# Patient Record
Sex: FEMALE | Race: WHITE
[De-identification: names, ages, dates, MRNs, and addresses within clinical notes are randomized per-mention and may not be internally consistent; named-entity substitution may affect disease eponyms.]

---

## 2020-02-05 ENCOUNTER — HOSPITAL ENCOUNTER (EMERGENCY)
Dept: HOSPITAL 25 - UCEAST | Age: 20
Discharge: HOME | End: 2020-02-05
Payer: COMMERCIAL

## 2020-02-05 VITALS — DIASTOLIC BLOOD PRESSURE: 84 MMHG | SYSTOLIC BLOOD PRESSURE: 122 MMHG

## 2020-02-05 DIAGNOSIS — J10.1: Primary | ICD-10-CM

## 2020-02-05 LAB — FLUBV RNA SPEC QL NAA+PROBE: POSITIVE

## 2020-02-05 PROCEDURE — 87651 STREP A DNA AMP PROBE: CPT

## 2020-02-05 PROCEDURE — G0463 HOSPITAL OUTPT CLINIC VISIT: HCPCS

## 2020-02-05 PROCEDURE — 99203 OFFICE O/P NEW LOW 30 MIN: CPT

## 2020-02-05 NOTE — UC
FLU HPI





- HPI Summary


HPI Summary: 


20-year-old female presents with 2 day history of fever, chills, fatigue, 

general malaise, body aches, nasal congestion, sore throat, a dry nonproductive 

cough.  States she feels mildly short of breath and wheezy with activity.  No 

history of asthma.  Denies ear pain, dysphagia, chest pain, abdominal pain, 

nausea, vomiting, or diarrhea.











- History of Current Complaint


Chief Complaint: UCRespiratory


Stated Complaint: SORE THROAT, EARACHE


Time Seen by Provider: 02/05/20 21:40


Hx Obtained From: Patient


Hx Last Menstrual Period: 1/15/20


Pain Intensity: 7





- Allergy/Home Medications


Allergies/Adverse Reactions: 


 Allergies











Allergy/AdvReac Type Severity Reaction Status Date / Time


 


No Known Allergies Allergy   Verified 02/05/20 20:44














PMH/Surg Hx/FS Hx/Imm Hx


Previously Healthy: Yes - Denies significant PMH





- Surgical History


Surgical History: None





- Family History


Known Family History: Positive: Non-Contributory





- Social History


Occupation: Student


Lives: Dormitory/Roommates


Alcohol Use: Weekly


Alcohol Amount: twice


Substance Use Type: None


Smoking Status (MU): Never Smoked Tobacco





Review of Systems


All Other Systems Reviewed And Are Negative: Yes


Constitutional: Positive: Fever, Chills, Fatigue


Skin: Negative: Rash


Eyes: Negative: Drainage, Eye Redness


ENT: Positive: Sore Throat, Nasal Discharge, Sinus Congestion.  Negative: Ear 

Ache, Sinus Pain/Tenderness


Respiratory: Positive: Shortness Of Breath, Cough, Other - Wheezing


Cardiovascular: Negative: Palpitations, Chest Pain


Gastrointestinal: Negative: Abdominal Pain, Vomiting, Diarrhea, Nausea


Genitourinary: Positive: Negative


Musculoskeletal: Positive: Myalgia


Neurological: Positive: Negative


Is Patient Immunocompromised?: No





Physical Exam





- Summary


Physical Exam Summary: 


GENERAL APPEARANCE: Well developed, well nourished, alert and cooperative, and 

appears to be in no acute distress.





EYES: Conjunctiva clear. No drainage.





EARS: External auditory canals and tympanic membranes clear, hearing grossly 

intact.





NOSE: Mild to moderate nasal congestion.  No nasal discharge.





THROAT: Pharyngeal erythema. No tonsilar inflammation, swelling, exudate, or 

lesions. Uvula midline.





NECK: Neck supple, non-tender without lymphadenopathy.





CARDIAC: Normal S1 and S2. No S3, S4 or murmurs. Rhythm is regular. There is no 

peripheral edema, cyanosis or pallor. Extremities are warm and well perfused. 

Capillary refill is less than 2 seconds. Peripheral pulses intact.





LUNGS: Clear to auscultation without rales, rhonchi, wheezing or diminished 

breath sounds.  Dry nonproductive cough.





ABDOMEN: Positive bowel sounds. Soft, nondistended, nontender. No guarding or 

rebound. No masses or hepatosplenomegally.





MUSKULOSKELETAL: ROM intact to all extremities. No joint erythema or 

tenderness. Normal muscular development. Normal gait.





SKIN: Skin normal color, texture and turgor with no lesions or eruptions.








Triage Information Reviewed: Yes


Vital Signs: 


 Initial Vital Signs











Temp  100.5 F   02/05/20 20:38


 


Pulse  105   02/05/20 20:38


 


Resp  20   02/05/20 20:38


 


BP  122/84   02/05/20 20:38


 


Pulse Ox  100   02/05/20 20:38











Vital Signs Reviewed: Yes





Flu Course/Dx





- Course


Course Of Treatment: 


20-year-old female presents with 2 day history of fever, chills, fatigue, 

general malaise, body aches, nasal congestion, sore throat, a dry nonproductive 

cough.  States she feels mildly short of breath and wheezy with activity.  No 

history of asthma.  Denies ear pain, dysphagia, chest pain, abdominal pain, 

nausea, vomiting, or diarrhea.  Patient had a mildly elevated temperature of 

100.5 F with a corresponding mild tachycardia otherwise vital signs are stable.

  Patient had been moderate nasal congestion, normal TMs, pharyngeal erythema, 

clear bilateral breath sounds, dry nonproductive cough, and otherwise 

unremarkable exam.  Rapid flu test was positive for influenza B. Reviewed 

results with patient and we discussed the risks and benefits of treating with 

Tamiflu which she is electing to start at this time.  She was given the first 

dose in the clinic.  Also going to provide her with an albuterol inhaler to use 

as needed for shortness of breath and wheezing.  Recommending symptomatic 

treatment including Tessalon Perles one capsule every 8 hours as needed for 

cough.  She was given a dose of this in the clinic as well.  She is to follow 

up at the [facility] in 5-7 days if symptoms are not improving.  

Anticipatory guidance warning symptoms are reviewed with the patient.  

Verbalizes understanding and agrees with plan of care.








- Differential Dx/Diagnosis


Differential Diagnosis/HQI/PQRI: Bronchitis, Influenza, Pneumonia, Upper 

Respiratory Infection


Provider Diagnosis: 


 Influenza B








Discharge ED





- Sign-Out/Discharge


Documenting (check all that apply): Patient Departure


All imaging exams completed and their final reports reviewed: No Studies





- Discharge Plan


Condition: Stable


Disposition: HOME


Prescriptions: 


Benzonatate CAP* [Tessalon 100 MG CAP*] 100 mg PO TID PRN #21 cap


 PRN Reason: Cough


Oseltamivir CAP* [Tamiflu CAP*] 75 mg PO BID #9 cap


Patient Education Materials:  Influenza (ED)


Referrals: 


No Primary Care Phys,NOPCP [Primary Care Provider] - 


Additional Instructions: 


Your flu test in the clinic today was positive for influenza B.





Start Tamiflu 1 capsule twice a day for 5 days. We gave you the first dose in 

the clinic.





Get plenty of rest.





Drink plenty of fluids to avoid dehydration especially if you are running any 

fever.





Use the albuterol inhaler 2 puffs every 4-6 hours as needed for shortness of 

breath or wheezing.





Take over the counter acetaminophen (Tylenol) or ibuprofen (Advil, Motrin) 

according to directions as needed for pain or fever.





Take Tessalon Perles 1 cap every 8 hours as needed for cough. You received a 

dose in the clinic at approximately 10:00 pm.





Use salt water gargles several times a day if you have a sore throat.





You may also use Chloraseptic spray or Cepacol lonzenges according to 

directions which contain a numbing medication and can provide some temporary 

relief from your sore throat.





Follow up with the [facility] in 5-7 days if symptoms persist.





Seek immediate medical attention in the emergency room if you have fever 

greater than 100.5 F despite taking acetaminophen or ibuprofen, have chest pain

, difficulty breathing, are unable to swallow, or have any worsening of 

symptoms.





- Billing Disposition and Condition


Condition: STABLE


Disposition: Home